# Patient Record
Sex: FEMALE | Race: WHITE
[De-identification: names, ages, dates, MRNs, and addresses within clinical notes are randomized per-mention and may not be internally consistent; named-entity substitution may affect disease eponyms.]

---

## 2022-07-12 ENCOUNTER — TRANSCRIPTION ENCOUNTER (OUTPATIENT)
Age: 72
End: 2022-07-12

## 2023-03-20 ENCOUNTER — NON-APPOINTMENT (OUTPATIENT)
Age: 73
End: 2023-03-20

## 2023-04-12 PROBLEM — Z00.00 ENCOUNTER FOR PREVENTIVE HEALTH EXAMINATION: Status: ACTIVE | Noted: 2023-04-12

## 2023-04-14 ENCOUNTER — TRANSCRIPTION ENCOUNTER (OUTPATIENT)
Age: 73
End: 2023-04-14

## 2023-04-14 ENCOUNTER — APPOINTMENT (OUTPATIENT)
Dept: VASCULAR SURGERY | Facility: CLINIC | Age: 73
End: 2023-04-14
Payer: MEDICARE

## 2023-04-14 ENCOUNTER — NON-APPOINTMENT (OUTPATIENT)
Age: 73
End: 2023-04-14

## 2023-04-14 VITALS — HEIGHT: 63 IN | BODY MASS INDEX: 29.95 KG/M2 | WEIGHT: 169 LBS

## 2023-04-14 DIAGNOSIS — Z84.89 FAMILY HISTORY OF OTHER SPECIFIED CONDITIONS: ICD-10-CM

## 2023-04-14 DIAGNOSIS — I10 ESSENTIAL (PRIMARY) HYPERTENSION: ICD-10-CM

## 2023-04-14 DIAGNOSIS — Z81.8 FAMILY HISTORY OF OTHER MENTAL AND BEHAVIORAL DISORDERS: ICD-10-CM

## 2023-04-14 DIAGNOSIS — E78.5 HYPERLIPIDEMIA, UNSPECIFIED: ICD-10-CM

## 2023-04-14 PROCEDURE — 99203 OFFICE O/P NEW LOW 30 MIN: CPT

## 2023-04-14 RX ORDER — PRAVASTATIN SODIUM 20 MG/1
20 TABLET ORAL
Refills: 0 | Status: ACTIVE | COMMUNITY

## 2023-04-14 RX ORDER — LOSARTAN POTASSIUM AND HYDROCHLOROTHIAZIDE 12.5; 5 MG/1; MG/1
50-12.5 TABLET ORAL
Refills: 0 | Status: ACTIVE | COMMUNITY

## 2023-04-14 NOTE — ASSESSMENT
[FreeTextEntry1] : 72-year-old female here for evaluation upon referral by Dr. 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  She was seen by Dr. Romano with complaints of lower extremity heaviness, issues with balance, as well as knee pain, she was diagnosed with bilateral lower extremity superficial venous reflux in the GSV and SSV in April 2021.  She continues to have persistent symptoms after her procedures, associated with a purplish discoloration of both lower extremities.  She was advised to undergo venographic evaluation for May Thurner syndrome by Dr. Romano.  She seeks a second opinion.\par Her most significant complaint currently is issues with balance as well as knee pain, she does have a history of spinal disease, for which she has been under care with Dr. Wilson, as well as knee osteoarthritis.\par She denies a prior history of DVT.\par \par I have advised the use of compression garments on a daily basis.  I did advise a venous duplex to assess for axial reflux as well as assess the waveforms and persistently of flow to see if there is evidence of venous outflow obstruction.  Based on this, we will determine if further invasive interrogation for May-Thurner's is indicated, such as venogram and IVUS.  She is in agreement.

## 2023-04-14 NOTE — REVIEW OF SYSTEMS
[Lower Ext Edema] : lower extremity edema [Limb Swelling] : limb swelling [Negative] : Gastrointestinal

## 2023-04-14 NOTE — HISTORY OF PRESENT ILLNESS
[FreeTextEntry1] : 72-year-old female here for evaluation upon referral by Dr. 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  She was seen by Dr. Romano with complaints of lower extremity heaviness, issues with balance, as well as knee pain, she was diagnosed with bilateral lower extremity superficial venous reflux in the GSV and SSV in April 2021.  She continues to have persistent symptoms after her procedures, associated with a purplish discoloration of both lower extremities.  She was advised to undergo venographic evaluation for May Thurner syndrome by Dr. Romano.  She seeks a second opinion.\par Her most significant complaint currently is issues with balance as well as knee pain, she does have a history of spinal disease, for which she has been under care with Dr. Wilson, as well as knee osteoarthritis.\par She denies a prior history of DVT.

## 2023-04-14 NOTE — PHYSICAL EXAM
[Normal Breath Sounds] : Normal breath sounds [2+] : left 2+ [Ankle Swelling (On Exam)] : present [Ankle Swelling Bilaterally] : bilaterally  [Alert] : alert [Oriented to Person] : oriented to person [Oriented to Place] : oriented to place [Calm] : calm [de-identified] : NAD [de-identified] : NCAT [de-identified] : Supple [de-identified] : Soft, nontender, nondistended

## 2023-05-01 ENCOUNTER — APPOINTMENT (OUTPATIENT)
Dept: VASCULAR SURGERY | Facility: CLINIC | Age: 73
End: 2023-05-01

## 2023-05-12 ENCOUNTER — APPOINTMENT (OUTPATIENT)
Dept: VASCULAR SURGERY | Facility: CLINIC | Age: 73
End: 2023-05-12
Payer: MEDICARE

## 2023-05-12 VITALS — BODY MASS INDEX: 29.59 KG/M2 | WEIGHT: 167 LBS | HEIGHT: 63 IN

## 2023-05-12 PROCEDURE — 99213 OFFICE O/P EST LOW 20 MIN: CPT

## 2023-05-12 RX ORDER — GABAPENTIN 300 MG/1
300 CAPSULE ORAL
Refills: 0 | Status: DISCONTINUED | COMMUNITY
End: 2023-05-12

## 2023-05-30 NOTE — DATA REVIEWED
[FreeTextEntry1] : SP DUPLEX VENOUS REFLUX PLETHYSMOGRAPHY BILATERAL \par  \par BILATERAL LOWER EXTREMITY VENOUS DOPPLER \par   \par HISTORY: Lower extremity venous insufficiency post ablation. \par   \par FINDINGS: \par   \par RIGHT LOWER EXTREMITY: \par There is normal venous flow augmentation and compression from the common femoral \par vein, superficial femoral vein, and popliteal vein. No evidence of deep vein \par thrombosis. There is right femoral vein reflux measuring 1.8 seconds. Right \par popliteal vein demonstrates no reflux. \par   \par Venous mapping measurements and reflux time (if present): \par SFJ:  8.1 mm, 2.7 seconds reflux \par GSV Thigh, prox: 3.5 mm, 3 seconds reflux. \par           Thigh, mid: 2.8 mm, no reflux. 14 cm length \par           Thigh, dist: Obliterated.  \par           Knee: Obliterated. \par           Calf, prox: 3.5 mm, 3.6 seconds reflux. \par           Calf, mid: 3 mm, no reflux. \par           Calf, dist: 3.1 mm, no reflux. \par SSV prox: 1.9 mm. No reflux noted in the SSV. \par          mid: 1.6 mm \par          distal: 1.6 mm \par   \par The right external iliac vein is patent and demonstrates normal waveform. \par   \par LEFT LOWER EXTREMITY: \par There is normal venous flow augmentation and compression from the common femoral \par vein, superficial femoral vein, and popliteal vein. No evidence of deep vein \par thrombosis. There is left femoral vein reflux measuring 1.6 seconds. Left \par popliteal vein demonstrates no reflux. \par   \par Venous mapping measurements and reflux time (if present): \par SFJ:  11.5 mm, no reflux. \par GSV Thigh, prox: Obliterated. \par           Thigh, mid: Obliterated. \par           Thigh, dist: Obliterated. \par           Knee: Obliterated. \par           Calf, prox: 2.2 mm, no reflux. \par           Calf, mid: 2.3 mm, no reflux. \par           Calf, dist: 2.5 mm, no reflux. \par SSV prox: 4 mm. No reflux noted in the SSV. \par          mid: 1.6 mm \par          distal: 1.2 mm \par   \par The left external iliac vein is patent and demonstrates normal waveform. \par   \par IMPRESSION: \par   \par Right lower extremity: No venous thrombosis. Evidence of obliteration of the \par distal thigh GSV. A 14 cm segment from the SFJ to the mid thigh is patent and \par demonstrates reflux. \par   \par Left lower extremity: No venous thrombosis. Evidence of left GSV obliteration. \par No reflux. \par   \par Normal waveforms noted in the bilateral external iliac veins. No DVT. \par   \par Electronically Signed in [employer] By Dr. Amos Villarreal MD on 5/4/2023 \par 12:58 PM

## 2023-05-30 NOTE — HISTORY OF PRESENT ILLNESS
[FreeTextEntry1] : 72-year-old female here for evaluation upon referral by Dr. 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  She was seen by Dr. Romano with complaints of lower extremity heaviness, issues with balance, as well as knee pain, she was diagnosed with bilateral lower extremity superficial venous reflux in the GSV and SSV in April 2021.  She continues to have persistent symptoms after her procedures, associated with a purplish discoloration of both lower extremities.  She was advised to undergo venographic evaluation for May Thurner syndrome by Dr. Romano.  She seeks a second opinion.\par Her most significant complaint currently is issues with balance as well as knee pain, she does have a history of spinal disease, for which she has been under care with Dr. Wilson, as well as knee osteoarthritis.\par She denies a prior history of DVT. [de-identified] : She is doing well with no changes since her last office visit.  She continues to have purplish discoloration of both lower extremities.  Again, more significant symptoms are issues with balance as well as focal knee pain.  She denies lower extremity heaviness or aching.

## 2023-05-30 NOTE — PHYSICAL EXAM
[Normal Breath Sounds] : Normal breath sounds [2+] : left 2+ [Ankle Swelling (On Exam)] : present [Ankle Swelling Bilaterally] : bilaterally  [Alert] : alert [Oriented to Person] : oriented to person [Oriented to Place] : oriented to place [Calm] : calm [de-identified] : NAD [de-identified] : NCAT [de-identified] : Supple [de-identified] : Soft, nontender, nondistended

## 2023-05-30 NOTE — ASSESSMENT
[FreeTextEntry1] : 72-year-old female here for evaluation upon referral by Dr. 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  She was seen by Dr. Romano with complaints of lower extremity heaviness, issues with balance, as well as knee pain, she was diagnosed with bilateral lower extremity superficial venous reflux in the GSV and SSV in April 2021.  She continues to have persistent symptoms after her procedures, associated with a purplish discoloration of both lower extremities.  She was advised to undergo venographic evaluation for May Thurner syndrome by Dr. Romano.  She seeks a second opinion.\par Her most significant complaint currently is issues with balance as well as knee pain, she does have a history of spinal disease, for which she has been under care with Dr. Wilson, as well as knee osteoarthritis.\par She denies a prior history of DVT.\par We performed a venous duplex which revealed results as stated above.  Most importantly, her iliac veins were visualized and patent, and the waveform of the lower extremity and iliacs did not suggest proximal venous obstruction or occlusion.  I discussed these findings with her, and I discussed that I do not believe that venography is necessary at this time.  She is in agreement with this.  She will follow-up with me on an as-needed basis.

## 2023-09-07 ENCOUNTER — APPOINTMENT (OUTPATIENT)
Dept: VASCULAR SURGERY | Facility: CLINIC | Age: 73
End: 2023-09-07
Payer: MEDICARE

## 2023-09-07 VITALS — DIASTOLIC BLOOD PRESSURE: 74 MMHG | SYSTOLIC BLOOD PRESSURE: 107 MMHG | HEART RATE: 86 BPM

## 2023-09-07 VITALS — DIASTOLIC BLOOD PRESSURE: 72 MMHG | SYSTOLIC BLOOD PRESSURE: 113 MMHG | HEART RATE: 76 BPM

## 2023-09-07 PROCEDURE — 36465Z: CUSTOM | Mod: RT

## 2023-09-07 NOTE — PROCEDURE
[FreeTextEntry1] : Right leg Varithena closure of GSV [FreeTextEntry2] : Right leg symptomatic venous reflux [FreeTextEntry3] : The patient was seen today for administration of Varithena to the incompetent GSV of the right leg. The patient was given an explanation of the protocol for the administration of Varithena.  We went over the potential adverse reactions and what to do in the event that 1 or more occur.  After agreement, the patient was positioned on the exam table to examine and measure the veins requiring treatment, the incompetent GSV above the right knee.  1% lidocaine was administered locally at the access site, the vein was then cannulated using ultrasound guidance, and venous access was confirmed.  The leg was elevated on a wedge.  Using aseptic technique and following manufactures guidance and product use, 5 mL of Varithena was withdrawn from the drug canister using the Varithena transfer unit to a sterile syringe, and was injected into the cannula slowly, and monitored using duplex ultrasound.  Venospasm of the treated vein was confirmed using ultrasound.  The catheter was removed from the treatment site and a compression bandage and stocking were applied to the treatment area.  The patient was monitored and observed for anaphylactic reaction without incident while walking in the office for 10 minutes and was instructed to schedule follow-up visit within the next 1 to 2 weeks to reexamine the treatment site using duplex ultrasound.  The patient was advised to keep posttreatment bandages dry in place for 48 hours, avoid heavy exercise for 1 week, and wear compression garments on the treated leg continuously for 2 weeks.  Patient was advised to avoid extended periods of inactivity, and walk daily for the 10 minutes over the next month.

## 2023-09-07 NOTE — ADDENDUM
[FreeTextEntry1] : The patient was given verbal and written instructions. The patient verbalized understanding of the instructions. Will follow up on ~ for post procedure u/s.

## 2023-09-15 ENCOUNTER — APPOINTMENT (OUTPATIENT)
Dept: VASCULAR SURGERY | Facility: CLINIC | Age: 73
End: 2023-09-15
Payer: MEDICARE

## 2023-09-15 PROCEDURE — 93971 EXTREMITY STUDY: CPT | Mod: RT

## 2023-09-21 ENCOUNTER — APPOINTMENT (OUTPATIENT)
Dept: VASCULAR SURGERY | Facility: CLINIC | Age: 73
End: 2023-09-21
Payer: MEDICARE

## 2023-09-21 VITALS — WEIGHT: 167 LBS | BODY MASS INDEX: 29.59 KG/M2 | HEIGHT: 63 IN

## 2023-09-21 DIAGNOSIS — M79.89 OTHER SPECIFIED SOFT TISSUE DISORDERS: ICD-10-CM

## 2023-09-21 PROCEDURE — 99213 OFFICE O/P EST LOW 20 MIN: CPT

## 2024-06-26 ENCOUNTER — APPOINTMENT (OUTPATIENT)
Dept: VASCULAR SURGERY | Facility: CLINIC | Age: 74
End: 2024-06-26
Payer: MEDICARE

## 2024-06-26 ENCOUNTER — RESULT REVIEW (OUTPATIENT)
Age: 74
End: 2024-06-26

## 2024-06-26 PROCEDURE — 99214 OFFICE O/P EST MOD 30 MIN: CPT

## 2024-07-19 ENCOUNTER — APPOINTMENT (OUTPATIENT)
Dept: VASCULAR SURGERY | Facility: CLINIC | Age: 74
End: 2024-07-19
Payer: MEDICARE

## 2024-07-19 PROCEDURE — 99214 OFFICE O/P EST MOD 30 MIN: CPT
